# Patient Record
Sex: MALE | Race: WHITE | NOT HISPANIC OR LATINO | Employment: OTHER | URBAN - METROPOLITAN AREA
[De-identification: names, ages, dates, MRNs, and addresses within clinical notes are randomized per-mention and may not be internally consistent; named-entity substitution may affect disease eponyms.]

---

## 2023-05-11 NOTE — PROGRESS NOTES
[facility] Cardiology  Consultation H&P  Anders Delgadillo  1940  [address]  [facility] [address]     VISIT DATE:  05/12/23    PCP: Rubén Brower  [address] [facility] [id number]    IDENTIFICATION: A 83 y.o. male  from [facility], wife sees [facility]  Previous Cardiologist: Dr. Cyr    PROBLEM LIST:  1. CAD s/p CABGx3 2/6/2014 ([facility])  a. LIMA to LAD, SVG to OM and SVG to PDA  b. LHC 2014: 100% occluded RCA, 90% calcified ostial lesion of the LAD, LCx 70%, large OM 70%, mid LV function  c. Echo 2/2014: EF 55-60%, trace MR, moderate to large pericardial effusion  2. Postoperative Atrial flutter 2/10/2014  3. Carotid disease  a. CUS 2/2014: 50-69% stenosis in Elia ICA, elevated velocities in R&L ECA  4. Former smoker  a. 75-pack-year history, quit 1986  5. Hypertension  6. Dyslipidemia  a. 4/23 183/96/58/106  7. 4/23 A1c 5.7  8. Poor hearing, poor vision  9. Surgical history:  a. CABG 2014  b. Appendectomy 1955  c. Double hernia repair 2000      CC:  Chief Complaint   Patient presents with   • Establish Care   • Coronary Artery Disease       Allergies  No Known Allergies    Current Medications    Current Outpatient Medications:   •  Aspirin 81 MG capsule, Take  by mouth., Disp: , Rfl:   •  atorvastatin (LIPITOR) 80 MG tablet, Take 1 tablet by mouth Daily., Disp: , Rfl:   •  escitalopram (LEXAPRO) 5 MG tablet, Take 1 tablet by mouth Daily., Disp: , Rfl:   •  finasteride (PROSCAR) 5 MG tablet, Take 1 tablet by mouth Daily., Disp: , Rfl:   •  furosemide (LASIX) 20 MG tablet, Take 1 tablet by mouth Daily., Disp: , Rfl:   •  metFORMIN (GLUCOPHAGE) 500 MG tablet, Take 1 tablet by mouth Daily., Disp: , Rfl:   •  pantoprazole (PROTONIX) 40 MG EC tablet, Take  by mouth., Disp: , Rfl:   •  potassium chloride ER (K-TAB) 20 MEQ tablet controlled-release ER tablet, Take 1 tablet by mouth Daily., Disp: , Rfl:   •  amLODIPine (NORVASC) 5 MG tablet, Take 1 tablet by mouth  "Daily., Disp: 90 tablet, Rfl: 3     History of Present Illness   HPI  Anders Delgadillo is a 83 y.o. year old male with the above mentioned PMH who presents for consult from Rubén Brower MD to reestablish cardiac care with history of CABG in 2014.  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  He complains of dizzy spells several times a week that can occur daytime but mostly when standing up or shortly after beginning to ambulate.  These episodes occur rarely during rest.  They are not associated with chest pain or palpitations.    Pt denies any chest pain, dyspnea at rest, orthopnea, PND, palpitations, lower extremity edema, or claudication. Pt denies history of CHF, DVT, PE, CVA, TIA, or rheumatic fever.       ROS  Review of Systems   Constitutional: Positive for malaise/fatigue.   Cardiovascular: Positive for dyspnea on exertion, leg swelling and orthopnea.   Musculoskeletal: Positive for joint pain.   Gastrointestinal: Positive for change in bowel habit, heartburn, nausea and vomiting.   Genitourinary: Positive for hematuria.   Neurological: Positive for excessive daytime sleepiness and dizziness.       SOCIAL HX  Social History     Socioeconomic History   • Marital status:    Tobacco Use   • Smoking status: Former     Years: 20.00     Types: Cigarettes   • Smokeless tobacco: Never   Vaping Use   • Vaping Use: Never used   Substance and Sexual Activity   • Alcohol use: Never   • Drug use: Never   • Sexual activity: Defer       FAMILY HX  Family History   Problem Relation Age of Onset   • Cancer Mother    • No Known Problems Father    • Lung disease Brother    • Cancer Brother        Vitals:    05/12/23 0844   BP: 131/50   BP Location: Right arm   Patient Position: Sitting   Pulse: 87   SpO2: 99%   Weight: 75.3 kg (166 lb)   Height: 170.2 cm (67\")     Body mass index is 26 kg/m².     PHYSICAL EXAMINATION:  Constitutional:       Appearance: Not in distress.      Comments: elderly "   Pulmonary:      Effort: Pulmonary effort is normal.      Breath sounds: Normal breath sounds. No wheezing. No rhonchi. No rales.   Chest:      Chest wall: Not tender to palpatation.   Cardiovascular:      Normal rate. Regular rhythm. Normal S1. Normal S2.      Murmurs: There is no murmur.      No gallop. No click. No rub.   Pulses:     Intact distal pulses.   Edema:     Peripheral edema absent.   Abdominal:      Palpations: Abdomen is soft.      Tenderness: There is no abdominal tenderness.   Skin:     General: Skin is warm and dry.   Neurological:      General: No focal deficit present.      Mental Status: Alert and oriented to person, place and time.         Diagnostic Data:    ECG 12 Lead    Date/Time: 5/12/2023 9:28 AM  Performed by: Cirilo Lott PA-C  Authorized by: Cirilo Lott PA-C   Comparison: compared with previous ECG from 3/14/2014  Comparison to previous ECG: Occasional PVCs present  Ectopy: unifocal PVCs  Rate: normal  BPM: 87  Conduction: right bundle branch block  QRS axis: left    Clinical impression: abnormal EKG          Lab Results   Component Value Date    CHLPL 160 02/05/2014    TRIG 113 02/05/2014    HDL 32 (L) 02/05/2014     Lab Results   Component Value Date    GLUCOSE 76 04/08/2014    BUN 15 04/08/2014    CREATININE 0.9 04/08/2014     04/08/2014    K 3.8 04/08/2014     04/08/2014    CO2 34 (H) 04/08/2014     Lab Results   Component Value Date    HGBA1C 6.2 (H) 02/19/2014     Lab Results   Component Value Date    WBC 4.99 02/24/2014    HGB 10.8 (L) 02/24/2014    HCT 34.0 (L) 02/24/2014     02/24/2014         Advance Care Planning   ACP discussion was declined by the patient. Patient has an advance directive (not in EMR), copy requested. Patient does not have an advance directive, declines further assistance.         ASSESSMENT:   Diagnosis Plan   1. CAD, multiple vessel  Adult Transthoracic Echo Complete W/ Cont if Necessary Per Protocol      2. Essential  hypertension        3. Dizziness  Adult Transthoracic Echo Complete W/ Cont if Necessary Per Protocol      4. S/P CABG (coronary artery bypass graft)            PLAN:  CAD s/p CABGx3 2014-remote revascularization.  Patient with no chest pain stable mild dyspnea on exertion.  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  Continue aspirin and atorvastatin. Echo to assess LV function.    Hypertension-patient's blood pressure near goal today at 131/50.  We will allow some degree of permissive hypertension in the setting of dizziness or presyncopal episodes.    Dizziness/Presyncope-patient describes lightheaded/dizzy episodes several times a week that are usually associated with changing positions and walking around.  His complaints sound like orthostasis.  Preventative measures against orthostasis were discussed with the patient.  We will require echocardiogram to document LV function and rule out significant structural disease. Will decrease amlodipine to 5 mg qd as it can worsen orthostasis.        Rubén Brower MD, thank you for referring Mr. Delgadillo for evaluation.  I have forwarded my electronically generated recommendations to you for review.  Please do not hesitate to call with any questions.    Scribed by Cirilo Lott PA-C for Demetrius Petit MD, FACC    Addendum: updated ROS

## 2023-05-12 ENCOUNTER — OFFICE VISIT (OUTPATIENT)
Dept: CARDIOLOGY | Facility: CLINIC | Age: 83
End: 2023-05-12
Payer: MEDICARE

## 2023-05-12 ENCOUNTER — HOSPITAL ENCOUNTER (OUTPATIENT)
Dept: CARDIOLOGY | Facility: HOSPITAL | Age: 83
Discharge: HOME | End: 2023-05-12
Payer: MEDICARE

## 2023-05-12 VITALS
DIASTOLIC BLOOD PRESSURE: 50 MMHG | HEART RATE: 87 BPM | SYSTOLIC BLOOD PRESSURE: 131 MMHG | BODY MASS INDEX: 26.06 KG/M2 | WEIGHT: 166 LBS | HEIGHT: 67 IN | OXYGEN SATURATION: 99 %

## 2023-05-12 DIAGNOSIS — R42 DIZZINESS: ICD-10-CM

## 2023-05-12 DIAGNOSIS — Z95.1 S/P CABG (CORONARY ARTERY BYPASS GRAFT): ICD-10-CM

## 2023-05-12 DIAGNOSIS — I10 ESSENTIAL HYPERTENSION: ICD-10-CM

## 2023-05-12 DIAGNOSIS — I25.10 CAD, MULTIPLE VESSEL: Primary | ICD-10-CM

## 2023-05-12 LAB
BH CV ECHO MEAS - AO MAX PG: 5.3 MMHG
BH CV ECHO MEAS - AO MEAN PG: 3.1 MMHG
BH CV ECHO MEAS - AO V2 MAX: 114.7 CM/SEC
BH CV ECHO MEAS - AO V2 VTI: 27.6 CM
BH CV ECHO MEAS - EDV(CUBED): 69.6 ML
BH CV ECHO MEAS - EDV(MOD-SP2): 51.4 ML
BH CV ECHO MEAS - EDV(MOD-SP4): 74.7 ML
BH CV ECHO MEAS - EF(MOD-BP): 61.7 %
BH CV ECHO MEAS - EF(MOD-SP2): 60.5 %
BH CV ECHO MEAS - EF(MOD-SP4): 62.9 %
BH CV ECHO MEAS - ESV(CUBED): 18.1 ML
BH CV ECHO MEAS - ESV(MOD-SP2): 20.3 ML
BH CV ECHO MEAS - ESV(MOD-SP4): 27.7 ML
BH CV ECHO MEAS - FS: 36.2 %
BH CV ECHO MEAS - IVS/LVPW: 0.91 CM
BH CV ECHO MEAS - IVSD: 1 CM
BH CV ECHO MEAS - LA DIMENSION: 4 CM
BH CV ECHO MEAS - LAT PEAK E' VEL: 10.4 CM/SEC
BH CV ECHO MEAS - LV DIASTOLIC VOL/BSA (35-75): 40 CM2
BH CV ECHO MEAS - LV MASS(C)D: 143 GRAMS
BH CV ECHO MEAS - LV MAX PG: 4.4 MMHG
BH CV ECHO MEAS - LV MEAN PG: 2.24 MMHG
BH CV ECHO MEAS - LV SYSTOLIC VOL/BSA (12-30): 14.8 CM2
BH CV ECHO MEAS - LV V1 MAX: 104.7 CM/SEC
BH CV ECHO MEAS - LV V1 VTI: 21.7 CM
BH CV ECHO MEAS - LVIDD: 4.1 CM
BH CV ECHO MEAS - LVIDS: 2.6 CM
BH CV ECHO MEAS - LVPWD: 1.1 CM
BH CV ECHO MEAS - MED PEAK E' VEL: 6.6 CM/SEC
BH CV ECHO MEAS - MV A MAX VEL: 85.7 CM/SEC
BH CV ECHO MEAS - MV DEC SLOPE: 398.3 CM/SEC2
BH CV ECHO MEAS - MV DEC TIME: 0.19 MSEC
BH CV ECHO MEAS - MV E MAX VEL: 65.6 CM/SEC
BH CV ECHO MEAS - MV E/A: 0.77
BH CV ECHO MEAS - MV P1/2T: 63.9 MSEC
BH CV ECHO MEAS - MVA(P1/2T): 3.4 CM2
BH CV ECHO MEAS - PA ACC TIME: 0.11 SEC
BH CV ECHO MEAS - PA PR(ACCEL): 31.5 MMHG
BH CV ECHO MEAS - PA V2 MAX: 101.8 CM/SEC
BH CV ECHO MEAS - SI(MOD-SP2): 16.6 ML/M2
BH CV ECHO MEAS - SI(MOD-SP4): 25.2 ML/M2
BH CV ECHO MEAS - SV(MOD-SP2): 31.1 ML
BH CV ECHO MEAS - SV(MOD-SP4): 47 ML
BH CV ECHO MEAS - TAPSE (>1.6): 1.26 CM
BH CV ECHO MEASUREMENTS AVERAGE E/E' RATIO: 7.72
BH CV XLRA - RV BASE: 3.3 CM
BH CV XLRA - RV LENGTH: 6.2 CM
BH CV XLRA - RV MID: 3.1 CM
BH CV XLRA - TDI S': 8.7 CM/SEC
LEFT ATRIUM VOLUME INDEX: 17.8 ML/M2

## 2023-05-12 PROCEDURE — 93306 TTE W/DOPPLER COMPLETE: CPT | Performed by: INTERNAL MEDICINE

## 2023-05-12 PROCEDURE — 93306 TTE W/DOPPLER COMPLETE: CPT

## 2023-05-12 RX ORDER — FINASTERIDE 5 MG/1
1 TABLET, FILM COATED ORAL DAILY
COMMUNITY
Start: 2023-03-16

## 2023-05-12 RX ORDER — AMLODIPINE BESYLATE 10 MG/1
1 TABLET ORAL DAILY
COMMUNITY
Start: 2023-03-10 | End: 2023-05-12 | Stop reason: DRUGHIGH

## 2023-05-12 RX ORDER — PANTOPRAZOLE SODIUM 40 MG/1
TABLET, DELAYED RELEASE ORAL
COMMUNITY
Start: 2013-11-15

## 2023-05-12 RX ORDER — AMLODIPINE BESYLATE 5 MG/1
5 TABLET ORAL DAILY
Qty: 90 TABLET | Refills: 3 | Status: SHIPPED | OUTPATIENT
Start: 2023-05-12

## 2023-05-12 RX ORDER — FUROSEMIDE 20 MG/1
1 TABLET ORAL DAILY
COMMUNITY
Start: 2023-05-10

## 2023-05-12 RX ORDER — POTASSIUM CHLORIDE 1500 MG/1
1 TABLET, FILM COATED, EXTENDED RELEASE ORAL DAILY
COMMUNITY
Start: 2023-05-10

## 2023-05-12 RX ORDER — ESCITALOPRAM OXALATE 5 MG/1
1 TABLET ORAL DAILY
COMMUNITY
Start: 2023-04-10

## 2023-05-12 RX ORDER — ATORVASTATIN CALCIUM 80 MG/1
1 TABLET, FILM COATED ORAL DAILY
COMMUNITY
Start: 2023-04-19

## 2023-05-15 ENCOUNTER — TELEPHONE (OUTPATIENT)
Dept: CARDIOLOGY | Facility: CLINIC | Age: 83
End: 2023-05-15
Payer: MEDICARE

## 2023-05-15 NOTE — TELEPHONE ENCOUNTER
I called Mr. Delgadillo to discuss his echocardiogram results.  Echocardiogram was within normal limits normal LV systolic function, grade I LV diastolic dysfunction C/W age, and no hemodynamically significant valvular disease.  We will call again in the coming days with next steps as the patient is still experiencing dizzy/lightheaded episodes.    Electronically signed by Cirilo Lott PA-C, 05/15/23, 4:19 PM EDT.

## 2023-05-16 ENCOUNTER — TELEPHONE (OUTPATIENT)
Dept: CARDIOLOGY | Facility: CLINIC | Age: 83
End: 2023-05-16
Payer: MEDICARE

## 2023-05-16 DIAGNOSIS — I65.23 BILATERAL CAROTID ARTERY STENOSIS: Primary | ICD-10-CM

## 2023-05-16 DIAGNOSIS — R42 DIZZINESS: ICD-10-CM

## 2023-05-16 PROBLEM — I77.9 CAROTID DISEASE, BILATERAL: Status: ACTIVE | Noted: 2023-05-16

## 2023-05-16 NOTE — TELEPHONE ENCOUNTER
Patient called to discuss further plans for his episodes of lightheadedness. His echo was unremarkable with normal LV systolic function, grade I diastolic function (c/w age), and no hemodynamically significant valvular disease. I confirmed with the patient again that he is getting lightheaded episodes when walking that are not associated with chest pain, shortness of breath or palpitations. I recommended the patient take his blood pressure when he has these episodes to investigate orthostasis and call our office if SBP drops <100 during these episodes. I recommended he make an appointment with his PCP to reassess his dizzy and lightheadedness episodes as they do not sound cardiac in nature. He may need an ENT referral. He has a history of carotid stenosis 50-69% bilaterally in 2014. He is not having any visual distrubances or stroke-like symptoms but he needs aroutine carotid ultrasound to reassess. Ordered Bilateral CUS today.    Electronically signed by Cirilo Lott PA-C, 05/16/23, 5:31 PM EDT.

## 2023-06-16 ENCOUNTER — HOSPITAL ENCOUNTER (OUTPATIENT)
Dept: CARDIOLOGY | Facility: HOSPITAL | Age: 83
Discharge: HOME | End: 2023-06-16
Payer: MEDICARE

## 2023-06-16 VITALS — BODY MASS INDEX: 25.9 KG/M2 | HEIGHT: 67 IN | WEIGHT: 165 LBS

## 2023-06-16 DIAGNOSIS — R42 DIZZINESS: ICD-10-CM

## 2023-06-16 DIAGNOSIS — I25.10 CAD, MULTIPLE VESSEL: ICD-10-CM

## 2023-06-16 LAB
BH CV XLRA MEAS LEFT DIST CCA EDV: 22.9 CM/SEC
BH CV XLRA MEAS LEFT DIST CCA PSV: 95 CM/SEC
BH CV XLRA MEAS LEFT DIST ICA EDV: 36.3 CM/SEC
BH CV XLRA MEAS LEFT DIST ICA PSV: 185 CM/SEC
BH CV XLRA MEAS LEFT ICA/CCA RATIO: 2.86
BH CV XLRA MEAS LEFT MID CCA EDV: 22.3 CM/SEC
BH CV XLRA MEAS LEFT MID CCA PSV: 98.5 CM/SEC
BH CV XLRA MEAS LEFT MID ICA EDV: 53.7 CM/SEC
BH CV XLRA MEAS LEFT MID ICA PSV: 215 CM/SEC
BH CV XLRA MEAS LEFT PROX CCA EDV: 23.5 CM/SEC
BH CV XLRA MEAS LEFT PROX CCA PSV: 107 CM/SEC
BH CV XLRA MEAS LEFT PROX ECA EDV: 17.7 CM/SEC
BH CV XLRA MEAS LEFT PROX ECA PSV: 158 CM/SEC
BH CV XLRA MEAS LEFT PROX ICA EDV: 66 CM/SEC
BH CV XLRA MEAS LEFT PROX ICA PSV: 272 CM/SEC
BH CV XLRA MEAS LEFT PROX SCLA PSV: 199 CM/SEC
BH CV XLRA MEAS LEFT VERTEBRAL A EDV: 13.4 CM/SEC
BH CV XLRA MEAS LEFT VERTEBRAL A PSV: 58.1 CM/SEC
BH CV XLRA MEAS RIGHT DIST CCA EDV: 20.5 CM/SEC
BH CV XLRA MEAS RIGHT DIST CCA PSV: 91.5 CM/SEC
BH CV XLRA MEAS RIGHT DIST ICA EDV: 26.9 CM/SEC
BH CV XLRA MEAS RIGHT DIST ICA PSV: 87.4 CM/SEC
BH CV XLRA MEAS RIGHT ICA/CCA RATIO: 3.71
BH CV XLRA MEAS RIGHT MID CCA EDV: 18.2 CM/SEC
BH CV XLRA MEAS RIGHT MID CCA PSV: 93.8 CM/SEC
BH CV XLRA MEAS RIGHT MID ICA EDV: 30.1 CM/SEC
BH CV XLRA MEAS RIGHT MID ICA PSV: 99.5 CM/SEC
BH CV XLRA MEAS RIGHT PROX CCA EDV: 16.5 CM/SEC
BH CV XLRA MEAS RIGHT PROX CCA PSV: 94.3 CM/SEC
BH CV XLRA MEAS RIGHT PROX ECA PSV: 173 CM/SEC
BH CV XLRA MEAS RIGHT PROX ICA EDV: 83.1 CM/SEC
BH CV XLRA MEAS RIGHT PROX ICA PSV: 348 CM/SEC
BH CV XLRA MEAS RIGHT PROX SCLA PSV: 170 CM/SEC
BH CV XLRA MEAS RIGHT VERTEBRAL A EDV: 12.6 CM/SEC
BH CV XLRA MEAS RIGHT VERTEBRAL A PSV: 59.7 CM/SEC
LEFT ARM BP: NORMAL MMHG
RIGHT ARM BP: NORMAL MMHG

## 2023-06-16 PROCEDURE — 93880 EXTRACRANIAL BILAT STUDY: CPT

## 2024-06-14 ENCOUNTER — HOSPITAL ENCOUNTER (OUTPATIENT)
Dept: CARDIOLOGY | Facility: HOSPITAL | Age: 84
Discharge: HOME | End: 2024-06-14
Payer: MEDICARE

## 2024-06-14 ENCOUNTER — OFFICE VISIT (OUTPATIENT)
Dept: CARDIOLOGY | Facility: CLINIC | Age: 84
End: 2024-06-14
Payer: MEDICARE

## 2024-06-14 VITALS — BODY MASS INDEX: 25.9 KG/M2 | HEIGHT: 67 IN | WEIGHT: 165 LBS

## 2024-06-14 VITALS
HEIGHT: 67 IN | WEIGHT: 167 LBS | SYSTOLIC BLOOD PRESSURE: 132 MMHG | BODY MASS INDEX: 26.21 KG/M2 | OXYGEN SATURATION: 98 % | DIASTOLIC BLOOD PRESSURE: 70 MMHG | HEART RATE: 77 BPM

## 2024-06-14 DIAGNOSIS — I25.10 CORONARY ARTERY DISEASE INVOLVING NATIVE CORONARY ARTERY OF NATIVE HEART WITHOUT ANGINA PECTORIS: Primary | ICD-10-CM

## 2024-06-14 DIAGNOSIS — I67.9 CVD (CEREBROVASCULAR DISEASE): ICD-10-CM

## 2024-06-14 DIAGNOSIS — E78.2 MIXED HYPERLIPIDEMIA: ICD-10-CM

## 2024-06-14 DIAGNOSIS — I65.23 BILATERAL CAROTID ARTERY STENOSIS: ICD-10-CM

## 2024-06-14 LAB
BH CV XLRA MEAS LEFT DIST CCA EDV: 19 CM/SEC
BH CV XLRA MEAS LEFT DIST CCA PSV: 86.1 CM/SEC
BH CV XLRA MEAS LEFT DIST ICA EDV: 30.3 CM/SEC
BH CV XLRA MEAS LEFT DIST ICA PSV: 150.6 CM/SEC
BH CV XLRA MEAS LEFT ICA/CCA RATIO: 2.85
BH CV XLRA MEAS LEFT MID CCA EDV: 18.1 CM/SEC
BH CV XLRA MEAS LEFT MID CCA PSV: 98.1 CM/SEC
BH CV XLRA MEAS LEFT MID ICA EDV: 36.3 CM/SEC
BH CV XLRA MEAS LEFT MID ICA PSV: 193.3 CM/SEC
BH CV XLRA MEAS LEFT PROX CCA EDV: 13.8 CM/SEC
BH CV XLRA MEAS LEFT PROX CCA PSV: 90.1 CM/SEC
BH CV XLRA MEAS LEFT PROX ECA PSV: 132.4 CM/SEC
BH CV XLRA MEAS LEFT PROX ICA EDV: 51.96 CM/SEC
BH CV XLRA MEAS LEFT PROX ICA PSV: 245 CM/SEC
BH CV XLRA MEAS LEFT PROX SCLA PSV: 244 CM/SEC
BH CV XLRA MEAS LEFT VERTEBRAL A EDV: 10.6 CM/SEC
BH CV XLRA MEAS LEFT VERTEBRAL A PSV: 61.8 CM/SEC
BH CV XLRA MEAS RIGHT DIST CCA EDV: 16.9 CM/SEC
BH CV XLRA MEAS RIGHT DIST CCA PSV: 84.8 CM/SEC
BH CV XLRA MEAS RIGHT DIST ICA EDV: 25.7 CM/SEC
BH CV XLRA MEAS RIGHT DIST ICA PSV: 133.1 CM/SEC
BH CV XLRA MEAS RIGHT ICA/CCA RATIO: 3.69
BH CV XLRA MEAS RIGHT MID CCA EDV: 13.4 CM/SEC
BH CV XLRA MEAS RIGHT MID CCA PSV: 91.3 CM/SEC
BH CV XLRA MEAS RIGHT MID ICA EDV: 53.6 CM/SEC
BH CV XLRA MEAS RIGHT MID ICA PSV: 312.4 CM/SEC
BH CV XLRA MEAS RIGHT PROX CCA EDV: 15.7 CM/SEC
BH CV XLRA MEAS RIGHT PROX CCA PSV: 125.7 CM/SEC
BH CV XLRA MEAS RIGHT PROX ECA PSV: 246.7 CM/SEC
BH CV XLRA MEAS RIGHT PROX ICA EDV: 61.7 CM/SEC
BH CV XLRA MEAS RIGHT PROX ICA PSV: 314.2 CM/SEC
BH CV XLRA MEAS RIGHT PROX SCLA PSV: 196.4 CM/SEC
BH CV XLRA MEAS RIGHT VERTEBRAL A EDV: 10.7 CM/SEC
BH CV XLRA MEAS RIGHT VERTEBRAL A PSV: 73.1 CM/SEC
LEFT ARM BP: NORMAL MMHG
RIGHT ARM BP: NORMAL MMHG

## 2024-06-14 PROCEDURE — 93880 EXTRACRANIAL BILAT STUDY: CPT

## 2024-06-14 PROCEDURE — 3075F SYST BP GE 130 - 139MM HG: CPT | Performed by: INTERNAL MEDICINE

## 2024-06-14 PROCEDURE — 99214 OFFICE O/P EST MOD 30 MIN: CPT | Performed by: INTERNAL MEDICINE

## 2024-06-14 PROCEDURE — 3078F DIAST BP <80 MM HG: CPT | Performed by: INTERNAL MEDICINE

## 2024-06-14 RX ORDER — EZETIMIBE 10 MG/1
10 TABLET ORAL DAILY
Qty: 90 TABLET | Refills: 3 | Status: SHIPPED | OUTPATIENT
Start: 2024-06-14

## 2024-06-14 NOTE — PROGRESS NOTES
[facility] Cardiology  Consultation H&P  Anders Delgadillo  1940  [address]  [facility] [address]     VISIT DATE:  06/14/24    [facility] [address] [facility] [id number]    IDENTIFICATION: A 84 y.o. male  from [facility], wife sees [facility]  Previous Cardiologist: Dr. Cyr    PROBLEM LIST:  CAD s/p CABGx3 2/6/2014 ([facility])  LEROY to LAD, SVG to OM and SVG to PDA  LHC 2014: 100% occluded RCA, 90% calcified ostial lesion of the LAD, LCx 70%, large OM 70%, mid LV function  Echo 2/2014: EF 55-60%, trace MR, moderate to large pericardial effusion  Postoperative Atrial flutter 2/10/2014    5/23 echo EF >60% ir   Carotid disease  CUS 2/2014: 50-69% stenosis in Elia ICA, elevated velocities in R&L ECA  6/24 <70% bilat  RBBB  Former smoker  75-pack-year history, quit 1986  Hypertension  Dyslipidemia  4/23 183/96/58/106  4/23 A1c 5.7  Poor hearing, poor vision  Surgical history:  CABG 2014  Appendectomy 1955  Double hernia repair 2000      CC:  Chief Complaint   Patient presents with    Hypertension       Allergies  No Known Allergies    Current Medications    Current Outpatient Medications:     amLODIPine (NORVASC) 5 MG tablet, Take 1 tablet by mouth Daily., Disp: 90 tablet, Rfl: 3    Aspirin 81 MG capsule, Take  by mouth., Disp: , Rfl:     atorvastatin (LIPITOR) 80 MG tablet, Take 1 tablet by mouth Daily., Disp: , Rfl:     escitalopram (LEXAPRO) 5 MG tablet, Take 1 tablet by mouth Daily., Disp: , Rfl:     finasteride (PROSCAR) 5 MG tablet, Take 1 tablet by mouth Daily., Disp: , Rfl:     furosemide (LASIX) 20 MG tablet, Take 1 tablet by mouth Daily., Disp: , Rfl:     metFORMIN (GLUCOPHAGE) 500 MG tablet, Take 1 tablet by mouth Daily., Disp: , Rfl:     pantoprazole (PROTONIX) 40 MG EC tablet, Take  by mouth., Disp: , Rfl:     potassium chloride ER (K-TAB) 20 MEQ tablet controlled-release ER tablet, Take 1 tablet by mouth Daily., Disp: , Rfl:      History of  "Present Illness   HPI  Anders Delgadillo is a 84 y.o. year old male with the above mentioned PMH who presents for follow-up.  Patient with no provocative chest symptoms.  Stated he actually planted a garden this spring that he had not in many years    Vitals:    06/14/24 1013   BP: 132/70   BP Location: Right arm   Patient Position: Sitting   Cuff Size: Adult   Pulse: 77   SpO2: 98%   Weight: 75.8 kg (167 lb)   Height: 170.2 cm (67.01\")     Body mass index is 26.15 kg/m².     PHYSICAL EXAMINATION:  Constitutional:       Appearance: Healthy appearance. Not in distress.      Comments: elderly   Neck:      Vascular: Carotid bruit present. No JVR. JVD normal.   Pulmonary:      Effort: Pulmonary effort is normal.      Breath sounds: Normal breath sounds. No wheezing. No rhonchi. No rales.   Chest:      Chest wall: Not tender to palpatation.   Cardiovascular:      Normal rate. Regular rhythm. Normal S1. Normal S2.       Murmurs: There is a systolic murmur.      No gallop.  No click. No rub.   Pulses:     Intact distal pulses.   Edema:     Peripheral edema absent.   Abdominal:      General: Bowel sounds are normal.      Palpations: Abdomen is soft.      Tenderness: There is no abdominal tenderness.   Musculoskeletal: Normal range of motion.         General: No tenderness. Skin:     General: Skin is warm and dry.   Neurological:      General: No focal deficit present.      Mental Status: Alert and oriented to person, place and time.         Diagnostic Data:  Procedures          Advance Care Planning   ACP discussion was declined by the patient. Patient has an advance directive (not in EMR), copy requested. Patient does not have an advance directive, declines further assistance.         ASSESSMENT:   Diagnosis Plan   1. Coronary artery disease involving native coronary artery of native heart without angina pectoris        2. CVD (cerebrovascular disease)        3. Mixed hyperlipidemia              PLAN:  CAD s/p CABGx3 " 2014-remote revascularization.  Patient with no chest pain stable mild dyspnea on exertion.  Preserved LVEF continue GDMT      Hypertension-controlled on current amlodipine    Carotid stenosis asymptomatic less than 70% bilaterally continue yearly duplex    Mixed dyslipidemia not at target we will add Zetia 10    No ref. provider found, thank you for referring Mr. Delgadillo for evaluation.  I have forwarded my electronically generated recommendations to you for review.  Please do not hesitate to call with any questions.     Demetrius Petit MD, FACC

## 2024-10-31 ENCOUNTER — TELEPHONE (OUTPATIENT)
Dept: CARDIOLOGY | Facility: CLINIC | Age: 84
End: 2024-10-31
Payer: MEDICARE

## 2024-10-31 NOTE — TELEPHONE ENCOUNTER
Pts daughter called states her father has fallen twice in the past 2 weeks for what is believed to be bradycardia . Pt currently is on his way to [place] to the ER . Advised daughter to call once pt has been discharged and we are happy to get the pt a follow up appointment . Verbalized understanding .